# Patient Record
Sex: MALE | Race: WHITE | NOT HISPANIC OR LATINO
[De-identification: names, ages, dates, MRNs, and addresses within clinical notes are randomized per-mention and may not be internally consistent; named-entity substitution may affect disease eponyms.]

---

## 2021-06-02 PROBLEM — Z00.00 ENCOUNTER FOR PREVENTIVE HEALTH EXAMINATION: Status: ACTIVE | Noted: 2021-06-02

## 2021-06-04 ENCOUNTER — APPOINTMENT (OUTPATIENT)
Dept: PULMONOLOGY | Facility: HOSPITAL | Age: 76
End: 2021-06-04
Payer: MEDICARE

## 2021-06-04 VITALS
SYSTOLIC BLOOD PRESSURE: 120 MMHG | OXYGEN SATURATION: 95 % | HEIGHT: 69 IN | BODY MASS INDEX: 19.11 KG/M2 | HEART RATE: 100 BPM | WEIGHT: 129 LBS | DIASTOLIC BLOOD PRESSURE: 80 MMHG

## 2021-06-04 DIAGNOSIS — I10 ESSENTIAL (PRIMARY) HYPERTENSION: ICD-10-CM

## 2021-06-04 DIAGNOSIS — J98.6 DISORDERS OF DIAPHRAGM: ICD-10-CM

## 2021-06-04 PROCEDURE — 99204 OFFICE O/P NEW MOD 45 MIN: CPT

## 2021-06-04 RX ORDER — ENALAPRIL MALEATE 5 MG/1
TABLET ORAL
Refills: 0 | Status: ACTIVE | COMMUNITY

## 2021-06-04 RX ORDER — AMLODIPINE BESYLATE 5 MG/1
TABLET ORAL
Refills: 0 | Status: ACTIVE | COMMUNITY

## 2021-06-04 RX ORDER — MIRTAZAPINE 7.5 MG/1
TABLET, FILM COATED ORAL
Refills: 0 | Status: ACTIVE | COMMUNITY

## 2021-06-04 NOTE — PHYSICAL EXAM
[General Appearance - Well Developed] : well developed [General Appearance - Well Nourished] : well nourished [II] : II [Neck Cervical Mass (___cm)] : no neck mass was observed [Jugular Venous Distention Increased] : there was no jugular-venous distention [Heart Sounds] : normal S1 and S2 [Murmurs] : no murmurs [] : no respiratory distress [Auscultation Breath Sounds / Voice Sounds] : lungs were clear to auscultation bilaterally [Nail Clubbing] : no clubbing of the fingernails [Cyanosis, Localized] : no localized cyanosis [No Focal Deficits] : no focal deficits [Oriented To Time, Place, And Person] : oriented to person, place, and time [Memory Recent] : recent memory was not impaired [FreeTextEntry1] : no tonsilar enlargement

## 2021-06-04 NOTE — HISTORY OF PRESENT ILLNESS
[FreeTextEntry1] : Dr. Thorne\par Dr. Pennington\par 75-year-old man with history of htn, left hemidiaphragm elevation, also under evaluation for possible ALS as patient has significant fatigue, overall weakness, loss of appetite.  Patient said he is not able to lay flat and become significantly out of breath if he lays flat, he sleeps with 3 pillows to prevent the shortness of breath episodes.  This morning patient was not able to catch his breath for half hour which improved by itself with time.  Patient is seeing an ALS specialist in [place] to evaluate further.  Patient does not think he has any snoring, has woken up few times with choking episodes at night.  Patient is extremely tired and sleepy during the day with [place] sleepiness score of 14, he does not drink excessive caffeinated products during the day.  Patient has nocturia he wakes up about 3-4 times at night. Due to all these medical problems patient is not driving.

## 2021-06-04 NOTE — REVIEW OF SYSTEMS
[EDS: ESS=____] : daytime somnolence: ESS=[unfilled] [Fatigue] : fatigue [Recent Wt Loss (___ Lbs)] : recent [unfilled] ~Ulb weight loss [Nocturia] : nocturia [Anxious] : anxious [Hypersomnolence] : sleeping much more than usual [Snoring] : no snoring [Difficulty Initiating Sleep] : no difficulty falling asleep [Difficulty Maintaining Sleep] : no difficulty maintaining sleep [Lower Extremity Discomfort] : no lower extremity discomfort

## 2021-06-04 NOTE — ASSESSMENT
[FreeTextEntry1] : 75-year-old man with left hemidiaphragm elevation and being evaluated by our ALS due to multiple symptoms as described above.\par \par Patient is not able to sleep flat and has to sleep in elevation with three pillows, will bring him in to do an in lab sleep study and also will monitor end-tidal CO2 to assess if there is any CO2 retention at night, will also look for hypoxia events at night due to hypoventilation.

## 2021-06-15 ENCOUNTER — APPOINTMENT (OUTPATIENT)
Dept: PULMONOLOGY | Facility: HOSPITAL | Age: 76
End: 2021-06-15
Payer: MEDICARE

## 2021-06-15 VITALS
WEIGHT: 129 LBS | HEIGHT: 69 IN | DIASTOLIC BLOOD PRESSURE: 80 MMHG | HEART RATE: 100 BPM | SYSTOLIC BLOOD PRESSURE: 120 MMHG | BODY MASS INDEX: 19.11 KG/M2

## 2021-06-15 PROCEDURE — 99215 OFFICE O/P EST HI 40 MIN: CPT

## 2021-06-15 NOTE — ASSESSMENT
[FreeTextEntry1] : 75-year-old man with possible ALS under evaluation, has some evidence of hypoventilation on the sleep study given the hypoxia and slightly elevated end-tidal CO2's.\par \par Although he is awake ABG is still normal without any hypercarbia at this time.\par \par Given with neuromuscular disease possibility I would prefer this patient to go on AVAPS machine called astral.  Will order astral for the patient and monitor him on it.  Once he gets used to the astral at home we will bring him in to do a repeat sleep study with the AVAPS on and see if the settings need to be adjusted.  Currently will start with a tidal volume of 450, pressure support of 6 with a EPAP of 5 and a respiratory rate of 12.\par \par Discussed all above with the patient and he is in agreement.

## 2021-06-15 NOTE — HISTORY OF PRESENT ILLNESS
[FreeTextEntry1] : Dr. Thorne\par Dr. Pennington\par 75-year-old man with history of htn, left hemidiaphragm elevation, also under evaluation for possible ALS as patient has significant fatigue, overall weakness, loss of appetite.  Patient said he is not able to lay flat and become significantly out of breath if he lays flat, he sleeps with 3 pillows to prevent the shortness of breath episodes.    Patient is seeing an ALS specialist in [place] to evaluate further.   Patient is extremely tired and sleepy during the day with [place] sleepiness score of 14.  Patient has nocturia he wakes up about 3-4 times at night. Due to all these medical problems patient is not driving.\par \par Patient underwent a sleep study which showed moderate obstructive sleep apnea in addition patient has hypoventilation which could be secondary to his neuromuscular weakness.  Patient averaged oxygen saturation about 92% with the lowest oxygen saturation of 81% during the night, his saturations were low even in the non  event period.  Patient also has elevated end-tidal CO2 his end-tidal CO2 remained between 45 to 50mmHg for about 60% of the time, for a few minutes it went to above 50 with a maximum number of 59.\par \par \par

## 2021-06-15 NOTE — PHYSICAL EXAM
[Well Groomed] : well groomed [II] : II [Heart Sounds] : normal S1 and S2 [Murmurs] : no murmurs [Exaggerated Use Of Accessory Muscles For Inspiration] : no accessory muscle use [Auscultation Breath Sounds / Voice Sounds] : lungs were clear to auscultation bilaterally [No Focal Deficits] : no focal deficits [Oriented To Time, Place, And Person] : oriented to person, place, and time [Memory Recent] : recent memory was not impaired

## 2021-08-06 ENCOUNTER — TRANSCRIPTION ENCOUNTER (OUTPATIENT)
Age: 76
End: 2021-08-06

## 2021-08-06 ENCOUNTER — APPOINTMENT (OUTPATIENT)
Dept: PULMONOLOGY | Facility: CLINIC | Age: 76
End: 2021-08-06
Payer: MEDICARE

## 2021-08-06 VITALS — WEIGHT: 125 LBS | HEIGHT: 69 IN | BODY MASS INDEX: 18.51 KG/M2

## 2021-08-06 PROCEDURE — 99214 OFFICE O/P EST MOD 30 MIN: CPT

## 2021-08-06 RX ORDER — ATORVASTATIN CALCIUM 80 MG/1
TABLET, FILM COATED ORAL
Refills: 0 | Status: DISCONTINUED | COMMUNITY
End: 2021-08-06

## 2021-08-06 RX ORDER — RILUZOLE 50 MG/1
TABLET ORAL
Refills: 0 | Status: ACTIVE | COMMUNITY

## 2021-08-10 NOTE — ASSESSMENT
[FreeTextEntry1] : 75-year-old man with history of ALS has obstructive sleep apnea.\par \par Patient has been on astral machine, he is doing clinically very well with the machine.  Patient symptoms have improved significantly since the first time I have seen him.  Patient is not waking up with shortness of breath episodes at night, his nocturia has improved significantly, his sleep has improved and he has more energy during the day.  He is able to lay flat now with this machine.\par \par Patient was asking if there is a simpler machine to use we talked about life 2000.  Patient will continue to use the current astral machine as it benefited him significantly with the symptoms.  In future if it is difficult for him to use the Astral then will talk about [employer] 2000 and see if that helps him as well.

## 2021-08-10 NOTE — HISTORY OF PRESENT ILLNESS
[FreeTextEntry1] : Dr. Thorne\par Dr. Pennington\par Dr. Arteaga from [place]\par 75-year-old man with history of htn, left hemidiaphragm elevation,  ALS is here for manaement of sleep apnea.  He is able  to lay flat with astral machine.  Patients tiredness improved.  Patients nocturia improved significantly. \par \par Patient underwent a sleep study which showed moderate obstructive sleep apnea in addition patient has hypoventilation which could be secondary to his neuromuscular weakness.  Patient averaged oxygen saturation about 92% with the lowest oxygen saturation of 81% during the night, his saturations were low even in the non  event period.  Patient also has elevated end-tidal CO2 his end-tidal CO2 remained between 45 to 50mmHg for about 60% of the time, for a few minutes it went to above 50 with a maximum number of 59.\par \par He is on ASTRAL machine and is doing better. His symptoms improved significantly from the first time I have seen him.\par \par \par

## 2021-08-10 NOTE — PHYSICAL EXAM
[General Appearance - Well Developed] : well developed [General Appearance - Well Nourished] : well nourished [Heart Sounds] : normal S1 and S2 [Murmurs] : no murmurs [II] : II [] : no respiratory distress [Auscultation Breath Sounds / Voice Sounds] : lungs were clear to auscultation bilaterally [No Focal Deficits] : no focal deficits [Oriented To Time, Place, And Person] : oriented to person, place, and time [Affect] : the affect was normal [Memory Recent] : recent memory was not impaired [FreeTextEntry1] : no edema

## 2021-11-01 ENCOUNTER — APPOINTMENT (OUTPATIENT)
Dept: PULMONOLOGY | Facility: CLINIC | Age: 76
End: 2021-11-01
Payer: MEDICARE

## 2021-11-01 VITALS
HEART RATE: 85 BPM | OXYGEN SATURATION: 91 % | WEIGHT: 125 LBS | DIASTOLIC BLOOD PRESSURE: 80 MMHG | SYSTOLIC BLOOD PRESSURE: 120 MMHG | BODY MASS INDEX: 18.51 KG/M2 | HEIGHT: 69 IN

## 2021-11-01 PROCEDURE — 99214 OFFICE O/P EST MOD 30 MIN: CPT

## 2021-11-01 NOTE — ASSESSMENT
[FreeTextEntry1] : 75-year-old man with history of ALS has obstructive sleep apnea.\par \par Patient has been on astral machine, he is doing clinically very well with the machine.  Patient symptoms have improved significantly since the first time I have seen him.  Patient is not waking up with shortness of breath episodes at night, his nocturia has improved significantly, his sleep has improved and he has more energy during the day.  He is able to lay flat now with this machine.\par \par Patient will continue to use the current astral machine as it benefited him significantly with the symptoms. WIll get pft and abg in [place] to see if he is retaining co2, as the disease progresses these patients tend to need positive pressure all the time.

## 2021-11-01 NOTE — HISTORY OF PRESENT ILLNESS
[FreeTextEntry1] : Dr. Thorne\par Dr. Pennington\par Dr. Arteaga from [place]\par 76 -year-old man with history of htn, left hemidiaphragm elevation,  ALS is here for manaement of sleep apnea.  He is able  to lay flat with astral machine.  Patients tiredness improved.  Patients nocturia improved significantly. \par \par Patient underwent a sleep study which showed moderate obstructive sleep apnea in addition patient has hypoventilation which could be secondary to his neuromuscular weakness.  Patient averaged oxygen saturation about 92% with the lowest oxygen saturation of 81% during the night, his saturations were low even in the non  event period.  Patient also has elevated end-tidal CO2 his end-tidal CO2 remained between 45 to 50mmHg for about 60% of the time, for a few minutes it went to above 50 with a maximum number of 59.\par \par He is on ASTRAL machine and is doing better. His symptoms improved significantly from the first time I have seen him. Download from [employer] shows that he is using on average about 7 hrs a night and is getting 500 cc of tidal volume.\par \par Overall he is slightly getting weaker, even during the day the saturation is slowly dropping, now his saturation at rest is 91%\par \par

## 2021-11-01 NOTE — PHYSICAL EXAM
[General Appearance - Well Developed] : well developed [General Appearance - Well Nourished] : well nourished [II] : II [Heart Sounds] : normal S1 and S2 [Murmurs] : no murmurs [] : no respiratory distress [Auscultation Breath Sounds / Voice Sounds] : lungs were clear to auscultation bilaterally [No Focal Deficits] : no focal deficits [Oriented To Time, Place, And Person] : oriented to person, place, and time [Memory Recent] : recent memory was not impaired [FreeTextEntry1] : no edema

## 2021-11-01 NOTE — REASON FOR VISIT
[Follow-Up] : a follow-up visit [Sleep Apnea] : sleep apnea [FreeTextEntry2] : ALS related hypoventilation

## 2022-01-18 ENCOUNTER — NON-APPOINTMENT (OUTPATIENT)
Age: 77
End: 2022-01-18

## 2022-07-08 ENCOUNTER — APPOINTMENT (OUTPATIENT)
Dept: PULMONOLOGY | Facility: CLINIC | Age: 77
End: 2022-07-08

## 2022-07-08 VITALS
DIASTOLIC BLOOD PRESSURE: 70 MMHG | HEIGHT: 69 IN | SYSTOLIC BLOOD PRESSURE: 110 MMHG | HEART RATE: 90 BPM | BODY MASS INDEX: 17.92 KG/M2 | WEIGHT: 121 LBS | OXYGEN SATURATION: 92 %

## 2022-07-08 DIAGNOSIS — Z87.898 PERSONAL HISTORY OF OTHER SPECIFIED CONDITIONS: ICD-10-CM

## 2022-07-08 PROCEDURE — 99213 OFFICE O/P EST LOW 20 MIN: CPT

## 2022-07-08 RX ORDER — EDARAVONE 105 MG/5ML
KIT ORAL
Refills: 0 | Status: ACTIVE | COMMUNITY

## 2022-07-08 NOTE — HISTORY OF PRESENT ILLNESS
[FreeTextEntry1] : Dr. Thorne\par Dr. Pennington\par  from [facility] for ALS\par \par 76 -year-old man with history of htn, left hemidiaphragm elevation,  ALS is here for management of sleep apnea.  He is able  to lay flat with astral machine.  Patients getting tired during the day.  Patients nocturia improved significantly. \par \par Patient underwent a sleep study which showed moderate obstructive sleep apnea in addition patient has hypoventilation which could be secondary to his neuromuscular weakness.  Patient averaged oxygen saturation about 92% with the lowest oxygen saturation of 81% during the night, his saturations were low even in the non  event period.  Patient also has elevated end-tidal CO2 his end-tidal CO2 remained between 45 to 50mmHg for about 60% of the time, for a few minutes it went to above 50 with a maximum number of 59.\par \par He is on ASTRAL machine and is doing better. His symptoms improved significantly from the first time I have seen him. Download from [employer] shows that he is using on average about 7 hrs a night and is getting 500 cc of tidal volume.\par \par Overall he is slightly getting weaker, even during the day the saturation is slowly dropping, now his saturation at rest is 92%\par \par

## 2022-07-08 NOTE — ASSESSMENT
[FreeTextEntry1] : 76-year-old man with history of ALS has obstructive sleep apnea.\par \par Patient has been on astral machine, he is doing clinically very well with the machine.  Patient symptoms have improved significantly since the first time I have seen him.  Patient is not waking up with shortness of breath episodes at night, his nocturia has improved significantly, his sleep has improved and he has more energy during the day.  He is able to lay flat now with this machine.\par \par Patient will continue to use the current astral machine as it benefited him significantly with the symptoms. As the disease progresses these patients tend to need positive pressure all the time.

## 2022-11-15 ENCOUNTER — APPOINTMENT (OUTPATIENT)
Dept: PULMONOLOGY | Facility: CLINIC | Age: 77
End: 2022-11-15

## 2022-11-15 VITALS
BODY MASS INDEX: 17.48 KG/M2 | HEIGHT: 69 IN | WEIGHT: 118 LBS | SYSTOLIC BLOOD PRESSURE: 110 MMHG | OXYGEN SATURATION: 93 % | DIASTOLIC BLOOD PRESSURE: 70 MMHG

## 2022-11-15 DIAGNOSIS — G12.21 AMYOTROPHIC LATERAL SCLEROSIS: ICD-10-CM

## 2022-11-15 PROCEDURE — 99214 OFFICE O/P EST MOD 30 MIN: CPT

## 2022-11-15 NOTE — HISTORY OF PRESENT ILLNESS
[FreeTextEntry1] : Dr. Thorne\par Dr. Pennington\par  from [facility] for ALS\par \par 77 -year-old man with history of htn, left hemidiaphragm elevation,  ALS is here for management of sleep apnea.  He is able  to lay flat with astral machine.  Patients is getting tired during the day.  Patients nocturia improved significantly. \par \par Patients weakness has gotten worse in the last couple months, he is using a cane to walk. Uses wheelchair if its longer walks. Predominantly home bound.\par \par Patient underwent a sleep study which showed moderate obstructive sleep apnea in addition patient has hypoventilation which could be secondary to his neuromuscular weakness.  Patient averaged oxygen saturation about 92% with the lowest oxygen saturation of 81% during the night, his saturations were low even in the non  event period.  Patient also has elevated end-tidal CO2 his end-tidal CO2 remained between 45 to 50mmHg for about 60% of the time, for a few minutes it went to above 50 with a maximum number of 59.\par \par He is on ASTRAL machine and is doing better. His symptoms improved significantly from the first time I have seen him. Download from [employer] shows that he is using on average about 7 hrs a night and is getting 500 cc of tidal volume.\par \par Overall he is getting weaker, he is using his astral machine more and more. \par \par

## 2023-01-26 ENCOUNTER — NON-APPOINTMENT (OUTPATIENT)
Age: 78
End: 2023-01-26

## 2023-01-26 ENCOUNTER — APPOINTMENT (OUTPATIENT)
Dept: PULMONOLOGY | Facility: CLINIC | Age: 78
End: 2023-01-26
Payer: MEDICARE

## 2023-01-26 ENCOUNTER — RESULT REVIEW (OUTPATIENT)
Age: 78
End: 2023-01-26

## 2023-01-26 VITALS
WEIGHT: 118 LBS | BODY MASS INDEX: 17.48 KG/M2 | SYSTOLIC BLOOD PRESSURE: 110 MMHG | OXYGEN SATURATION: 94 % | DIASTOLIC BLOOD PRESSURE: 70 MMHG | HEART RATE: 90 BPM | HEIGHT: 69 IN

## 2023-01-26 DIAGNOSIS — J96.10 CHRONIC RESPIRATORY FAILURE, UNSPECIFIED WHETHER WITH HYPOXIA OR HYPERCAPNIA: ICD-10-CM

## 2023-01-26 DIAGNOSIS — J98.4 OTHER DISORDERS OF LUNG: ICD-10-CM

## 2023-01-26 DIAGNOSIS — G47.33 OBSTRUCTIVE SLEEP APNEA (ADULT) (PEDIATRIC): ICD-10-CM

## 2023-01-26 PROCEDURE — ZZZZZ: CPT

## 2023-01-26 NOTE — HISTORY OF PRESENT ILLNESS
[FreeTextEntry1] : Dr. Thorne\par Dr. Pennington\par  from [facility] for ALS\par \par 77 -year-old man with history of htn, left hemidiaphragm elevation,  ALS is here for management of sleep apnea/hypoxia.  He is able  to lay flat with astral machine.  Patients is getting tired during the day.  Patients nocturia improved significantly. \par \par Patients weakness has gotten worse in the last few months, he is using a cane to walk. Uses wheelchair if its longer walks. Predominantly home bound.\par \par Patient underwent a sleep study which showed moderate obstructive sleep apnea in addition patient has hypoventilation which could be secondary to his neuromuscular weakness.  Patient averaged oxygen saturation about 92% with the lowest oxygen saturation of 81% during the night, his saturations were low even in the non  event period.  Patient also has elevated end-tidal CO2 his end-tidal CO2 remained between 45 to 50mmHg for about 60% of the time, for a few minutes it went to above 50 with a maximum number of 59.\par \par He is on ASTRAL machine and is doing better. His symptoms improved significantly from the first time I have seen him. Download from [employer] shows that he is using on average about 7 hrs a night and is getting 500 cc of tidal volume.\par \par Overall he is getting weaker, he is using his astral machine more and more. \par \par 1/26/2023 oxygen testing in my office today\par \par Baseline - 94% on room air while sitting.\par \par Walk test - 85% after walking few steps.\par \par Walk test with oxygen 2 liters - 92% after walking.\par \par

## 2023-01-26 NOTE — ASSESSMENT
[FreeTextEntry1] : 77 -year-old man with history of ALS has obstructive sleep apnea.\par \par Patient has been on astral machine, he is slowly deteriorating. Requires positive pressure more often.    Patient is not waking up with shortness of breath episodes at night, his nocturia has improved significantly.  He is able to lay flat now with this machine.\par \par Today I talked to judith and his wife the possibility of the pulmonary status getting worse. I suggested they discuss about hospice and do not resuscitate.\par \par His hypoxia improved with oxygen, there is significant hypoxia when he walks without oxygen.\par \par With oxygen pt feels less short of breath and oxygen saturations remained normal with walking.